# Patient Record
Sex: MALE | Race: WHITE | NOT HISPANIC OR LATINO
[De-identification: names, ages, dates, MRNs, and addresses within clinical notes are randomized per-mention and may not be internally consistent; named-entity substitution may affect disease eponyms.]

---

## 2021-02-09 DIAGNOSIS — Z00.00 ENCOUNTER FOR GENERAL ADULT MEDICAL EXAMINATION W/OUT ABNORMAL FINDINGS: ICD-10-CM

## 2021-03-01 ENCOUNTER — APPOINTMENT (OUTPATIENT)
Dept: FAMILY MEDICINE | Facility: CLINIC | Age: 46
End: 2021-03-01

## 2022-12-16 ENCOUNTER — NON-APPOINTMENT (OUTPATIENT)
Age: 47
End: 2022-12-16

## 2022-12-28 ENCOUNTER — APPOINTMENT (OUTPATIENT)
Dept: OPHTHALMOLOGY | Facility: CLINIC | Age: 47
End: 2022-12-28

## 2023-03-16 ENCOUNTER — APPOINTMENT (OUTPATIENT)
Dept: ORTHOPEDIC SURGERY | Facility: CLINIC | Age: 48
End: 2023-03-16
Payer: OTHER MISCELLANEOUS

## 2023-03-16 VITALS — HEIGHT: 64 IN | WEIGHT: 220 LBS | BODY MASS INDEX: 37.56 KG/M2

## 2023-03-16 DIAGNOSIS — Z78.9 OTHER SPECIFIED HEALTH STATUS: ICD-10-CM

## 2023-03-16 DIAGNOSIS — Z82.49 FAMILY HISTORY OF ISCHEMIC HEART DISEASE AND OTHER DISEASES OF THE CIRCULATORY SYSTEM: ICD-10-CM

## 2023-03-16 DIAGNOSIS — Z80.3 FAMILY HISTORY OF MALIGNANT NEOPLASM OF BREAST: ICD-10-CM

## 2023-03-16 DIAGNOSIS — Z72.89 OTHER PROBLEMS RELATED TO LIFESTYLE: ICD-10-CM

## 2023-03-16 DIAGNOSIS — Z83.3 FAMILY HISTORY OF DIABETES MELLITUS: ICD-10-CM

## 2023-03-16 PROCEDURE — 99204 OFFICE O/P NEW MOD 45 MIN: CPT

## 2023-03-16 PROCEDURE — 99072 ADDL SUPL MATRL&STAF TM PHE: CPT

## 2023-03-16 PROCEDURE — 73564 X-RAY EXAM KNEE 4 OR MORE: CPT | Mod: LT

## 2023-03-17 NOTE — HISTORY OF PRESENT ILLNESS
[de-identified] : The patient is a 47 year old R hand dominant male who presents today complaining of L knee pain.  \par Date of Injury/Onset: 10/12/22\par Pain:    At Rest: 5/10 \par With Activity:  10/10 \par Mechanism of injury: Patient reported slipping on a wet roof and felt the involved knee buckle. \par This is a Work Related Injury being treated under Worker's Compensation.\par This is NOT an athletic injury occurring associated with an interscholastic or organized sports team.\par Quality of symptoms: locking, aching, throbbing\par Improves with: N/A\par Worse with: Putting on socks, rotational motions\par Prior treatment: Heat, Ice\par Prior Imaging: N/A\par Out of work/sport: NO, since _\par School/Sport/Position/Occupation:  - [employer]\par Additional Information: None\par

## 2023-03-17 NOTE — IMAGING
[Left] : left knee [All Views] : anteroposterior, lateral, skyline, and anteroposterior standing [de-identified] : The patient is a well appearing 47 year  old male of their stated age. \par Patient ambulates with a normal gait. \par Negative straight leg raise bilateral \par \par Effected Knee: LEFT\par ROM:  0-135 degrees \par Lachman: Negative \par Pivot Shift: Negative \par Anterior Drawer: Negative \par Posterior Drawer / Sag:Negative \par Varus Stress 0 degrees: Stable \par Varus Stress 30 degrees: Stable, WITH PAIN\par Valgus Stress 0 degrees: Stable \par Valgus Stress 30 degrees: Stable \par Medial Roxane: Negative \par Lateral Roxane: POSITIVE \par Patella Glide: 2+ \par Patella Apprehension: Negative \par Patella Grind: Negative \par \par Palpation: \par Medial Joint Line: Nontender \par Lateral Joint Line: TENDER \par Medial Collateral Ligament: Nontender \par Lateral Collateral Ligament/PLC: TENDER \par Distal Femur: Nontender \par Proximal Tibia: Nontender \par Tibial Tubercle: Nontender \par Distal Pole Patella: Nontender \par Quadriceps Tendon: Nontender &  Intact \par Patella Tendon: Nontender &  Intact \par Medial Femoral Condyle: Nontender \par Medial Distal Hamstring/PES: Nontender \par Lateral Distal Hamstring: Nontender & Stable \par Iliotibial Band: Nontender \par Medial Patellofemoral Ligament: Nontender \par Adductor: Nontender \par Proximal GSC-Plantaris: Nontender \par Calf: Supple & Nontender \par \par Inspection: \par Deformity: No \par Erythema: No \par Ecchymosis: No \par Abrasions: No \par Effusion: No \par Prepatella Bursitis: No \par Neurologic Exam: \par Sensation L4-S1: Grossly Intact \par Motor Exam: \par Quadriceps: 5 out of 5 \par Hamstrings: 5 out of 5 \par EHL: 5 out of 5 \par FHL: 5 out of 5 \par TA: 5 out of 5 \par GS: 5 out of 5 \par Circulatory/Pulses: \par Dorsalis Pedis: 2+ \par Posterior Tibialis: 2+ \par Additional Pertinent Findings: None \par \par Contralateral Knee:                           	 \par ROM: 0-145 degrees \par Other Pertinent Findings: None \par \par Assessment: The patient is a 47 year old male with left knee pain and radiographic and physical exam findings consistent with possible latera meniscus tear vs LCL sprain.   \par The patient’s condition is acute \par Documents/Results Reviewed Today: X-Ray left knee \par Tests/Studies Independently Interpreted Today: X-Ray left knee reveals mild patellofemoral OA and enthesophyte on superior aspect of patella\par Pertinent findings include: 0-135, LJLT, +lateral mcmurrays, pain with varus stress at 30 degrees, tender LCL\par Confounding medical conditions/concerns: None\par \par Plan: Due to worsening pain and instability with mechanical symptoms, patient will obtain MRI left knee to evaluate for possible lateral meniscus tear vs LCL sprain. Patient has proven refractory to all previous conservative treatments including but not limited to  activity modifications, rest, ice, antiinflammatories etc. Modify activity as discussed. \par Tests Ordered: MRI left knee \par Prescription Medications Ordered: Discussed appropriate use of OTC anti-inflammatories and analgesics (including but not limited to Aleve, Advil, Tylenol, Motrin, Ibuprofen, Voltaren gel, etc.) \par Braces/DME Ordered: None \par Activity/Work/Sports Status: None \par Additional Instructions: None\par Follow-Up: After MRI \par \par The patient's current medication management of their orthopedic diagnosis was reviewed today:\par (1) We discussed a comprehensive treatment plan that included possible pharmaceutical management involving the use of prescription strength medications including but not limited to options such as oral Naprosyn 500mg BID, once daily Meloxicam 15 mg, or 500-650 mg Tylenol versus over the counter oral medications and topical prescription NSAID Pennsaid vs over the counter Voltaren gel.  Based on our extensive discussion, the patient declined prescription medication and will use over the counter Advil, Alleve, Voltaren Gel or Tylenol as directed.\par (2) There is a moderate risk of morbidity with further treatment, especially from use of prescription strength medications and possible side effects of these medications which include upset stomach with oral medications, skin reactions to topical medications and cardiac/renal issues with long term use.\par (3) I recommended that the patient follow-up with their medical physician to discuss any significant specific potential issues with long term medication use such as interactions with current medications or with exacerbation of underlying medical comorbidities.\par (4) The benefits and risks associated with use of injectable, oral or topical, prescription and over the counter anti-inflammatory medications were discussed with the patient. The patient voiced understanding of the risks including but not limited to bleeding, stroke, kidney dysfunction, heart disease, and were referred to the black box warning label for further information. \par  \par Eula OSUNA attest that this documentation has been prepared under the direction and in the presence of Provider Dr. Kavon Salazar. \par \par The documentation recorded by the scribe accurately reflects the services IDr. Kavon, personally performed and the decisions made by me.\par  [FreeTextEntry9] :  X-Ray left knee reveals mild patellofemoral OA and enthesophyte on superior aspect of patella

## 2023-04-23 ENCOUNTER — FORM ENCOUNTER (OUTPATIENT)
Age: 48
End: 2023-04-23

## 2023-04-24 ENCOUNTER — APPOINTMENT (OUTPATIENT)
Dept: MRI IMAGING | Facility: CLINIC | Age: 48
End: 2023-04-24
Payer: OTHER MISCELLANEOUS

## 2023-04-24 PROCEDURE — 73721 MRI JNT OF LWR EXTRE W/O DYE: CPT | Mod: LT

## 2023-05-04 ENCOUNTER — APPOINTMENT (OUTPATIENT)
Dept: ORTHOPEDIC SURGERY | Facility: CLINIC | Age: 48
End: 2023-05-04
Payer: OTHER MISCELLANEOUS

## 2023-05-04 VITALS — WEIGHT: 220 LBS | HEIGHT: 64 IN | BODY MASS INDEX: 37.56 KG/M2

## 2023-05-04 DIAGNOSIS — S83.412A SPRAIN OF MEDIAL COLLATERAL LIGAMENT OF LEFT KNEE, INITIAL ENCOUNTER: ICD-10-CM

## 2023-05-04 DIAGNOSIS — M25.562 PAIN IN LEFT KNEE: ICD-10-CM

## 2023-05-04 DIAGNOSIS — M76.899 OTHER SPECIFIED ENTHESOPATHIES OF UNSPECIFIED LOWER LIMB, EXCLUDING FOOT: ICD-10-CM

## 2023-05-04 PROCEDURE — 99214 OFFICE O/P EST MOD 30 MIN: CPT | Mod: 25

## 2023-05-04 PROCEDURE — L2397: CPT | Mod: LT

## 2023-05-04 PROCEDURE — L1833: CPT | Mod: LT

## 2023-05-04 RX ORDER — NAPROXEN 500 MG/1
500 TABLET ORAL
Qty: 60 | Refills: 1 | Status: ACTIVE | COMMUNITY
Start: 2023-05-04 | End: 1900-01-01

## 2023-05-04 NOTE — WORK
[Sprain/Strain] : sprain/strain [Was the competent medical cause of the injury] : was the competent medical cause of the injury [Are consistent with the injury] : are consistent with the injury [Consistent with my objective findings] : consistent with my objective findings [Partial] : partial [Does not reveal pre-existing condition(s) that may affect treatment/prognosis] : does not reveal pre-existing condition(s) that may affect treatment/prognosis [Patient] : patient [No Rx restrictions] : No Rx restrictions. [I provided the services listed above] :  I provided the services listed above.

## 2023-05-05 PROBLEM — M76.899 QUADRICEPS TENDONITIS: Status: ACTIVE | Noted: 2023-05-04

## 2023-05-05 NOTE — HISTORY OF PRESENT ILLNESS
[de-identified] : The patient is a 47 year old R hand dominant male who presents today complaining of L knee pain.  \par Date of Injury/Onset: 10/12/22\par Pain:    At Rest: 5/10 \par With Activity:  10/10 \par Mechanism of injury: Patient reported slipping on a wet roof and felt the involved knee buckle. \par This is a Work Related Injury being treated under Worker's Compensation.\par This is NOT an athletic injury occurring associated with an interscholastic or organized sports team.\par Quality of symptoms: locking, aching, throbbing\par Improves with: N/A\par Worse with: Putting on socks, rotational motions\par Treatment/Imaging/Studies Since Last Visit: MRI\par 	Reports Available For Review Today: mri report\par Out of work/sport: currently working\par School/Sport/Position/Occupation:  - [employer]\par Additional Information: None\par

## 2023-05-05 NOTE — DATA REVIEWED
[MRI] : MRI [Left] : left [Knee] : knee [Report was reviewed and noted in the chart] : The report was reviewed and noted in the chart [I independently reviewed and interpreted images and report] : I independently reviewed and interpreted images and report [I reviewed the films/CD and additionally noted] : I reviewed the films/CD and additionally noted [FreeTextEntry1] : MRI left knee reveals evidence of inflammation about the medial collateral ligament and quadriceps tendonitis.

## 2023-05-05 NOTE — IMAGING
[Left] : left knee [All Views] : anteroposterior, lateral, skyline, and anteroposterior standing [FreeTextEntry9] :  X-Ray left knee reveals mild patellofemoral OA and enthesophyte on superior aspect of patella [de-identified] : The patient is a well appearing 47 year  old male of their stated age. \par Patient ambulates with a normal gait. \par Negative straight leg raise bilateral \par \par Effected Knee: LEFT\par ROM:  0-135 degrees \par Lachman: Negative \par Pivot Shift: Negative \par Anterior Drawer: Negative \par Posterior Drawer / Sag:Negative \par Varus Stress 0 degrees: Stable \par Varus Stress 30 degrees: Stable, WITH PAIN\par Valgus Stress 0 degrees: Stable \par Valgus Stress 30 degrees: Stable \par Medial Roxane: Negative \par Lateral Roxane: POSITIVE \par Patella Glide: 2+ \par Patella Apprehension: Negative \par Patella Grind: Negative \par \par Palpation: \par Medial Joint Line: Nontender \par Lateral Joint Line: TENDER \par Medial Collateral Ligament: Nontender \par Lateral Collateral Ligament/PLC: TENDER \par Distal Femur: Nontender \par Proximal Tibia: Nontender \par Tibial Tubercle: Nontender \par Distal Pole Patella: Nontender \par Quadriceps Tendon: Nontender &  Intact \par Patella Tendon: Nontender &  Intact \par Medial Femoral Condyle: Nontender \par Medial Distal Hamstring/PES: Nontender \par Lateral Distal Hamstring: Nontender & Stable \par Iliotibial Band: Nontender \par Medial Patellofemoral Ligament: Nontender \par Adductor: Nontender \par Proximal GSC-Plantaris: Nontender \par Calf: Supple & Nontender \par \par Inspection: \par Deformity: No \par Erythema: No \par Ecchymosis: No \par Abrasions: No \par Effusion: No \par Prepatella Bursitis: No \par Neurologic Exam: \par Sensation L4-S1: Grossly Intact \par Motor Exam: \par Quadriceps: 5 out of 5 \par Hamstrings: 5 out of 5 \par EHL: 5 out of 5 \par FHL: 5 out of 5 \par TA: 5 out of 5 \par GS: 5 out of 5 \par Circulatory/Pulses: \par Dorsalis Pedis: 2+ \par Posterior Tibialis: 2+ \par Additional Pertinent Findings: None \par \par Contralateral Knee:                           	 \par ROM: 0-145 degrees \par Other Pertinent Findings: None \par \par Assessment: The patient is a 47 year old male with left knee pain and radiographic and physical exam findings consistent with quadriceps tendonitis and grade 1 MCL sprain.   \par The patient’s condition is acute \par Documents/Results Reviewed Today: MRI left knee \par Tests/Studies Independently Interpreted Today: MRI left knee reveals evidence of inflammation about the medial collateral ligament and quadriceps tendonitis. \par Pertinent findings include: 0-135, LJLT, +lateral mcmurrays, pain with varus stress at 30 degrees, tender LCL\par Confounding medical conditions/concerns: None\par \par Plan: Patient will start physical therapy, HEP, and stretching. The patient will begin use of Playmaker knee brace for all daily activity to ensure stability and avoid further injury. Advised patient to obtain Reparel sleeve. Prescribed patient Naproxen for pain management and inflammation - use as directed and take with food. Modify activity as discussed.\par Tests Ordered: None \par Prescription Medications Ordered: Naproxen \par Braces/DME Ordered: Playmaker - fitted and dispensed in office. \par Activity/Work/Sports Status: None \par Additional Instructions: None\par Follow-Up: 6 weeks \par \par The patient's current medication management of their orthopedic diagnosis was reviewed today:\par (1) We discussed a comprehensive treatment plan that included possible pharmaceutical management involving the use of prescription strength medications including but not limited to options such as oral Naprosyn 500mg BID, once daily Meloxicam 15 mg, or 500-650 mg Tylenol versus over the counter oral medications and topical prescription NSAID Pennsaid vs over the counter Voltaren gel.  Based on our extensive discussion, the patient was prescribed Naprosyn 500mg BID for two weeks.  It will then be used PRN for pain, inflammation and discomfort.\par (2) There is a moderate risk of morbidity with further treatment, especially from use of prescription strength medications and possible side effects of these medications which include upset stomach with oral medications, skin reactions to topical medications and cardiac/renal issues with long term use.\par (3) I recommended that the patient follow-up with their medical physician to discuss any significant specific potential issues with long term medication use such as interactions with current medications or with exacerbation of underlying medical comorbidities.\par (4) The benefits and risks associated with use of injectable, oral or topical, prescription and over the counter anti-inflammatory medications were discussed with the patient. The patient voiced understanding of the risks including but not limited to bleeding, stroke, kidney dysfunction, heart disease, and were referred to the black box warning label for further information. \par  \par Eula OSUNA attest that this documentation has been prepared under the direction and in the presence of Provider Dr. Kavon Salazar. \par \par The documentation recorded by the scribe accurately reflects the services IDr. Kavon, personally performed and the decisions made by me.\par

## 2023-06-15 ENCOUNTER — APPOINTMENT (OUTPATIENT)
Dept: ORTHOPEDIC SURGERY | Facility: CLINIC | Age: 48
End: 2023-06-15